# Patient Record
Sex: FEMALE | Race: WHITE | NOT HISPANIC OR LATINO | Employment: OTHER | URBAN - METROPOLITAN AREA
[De-identification: names, ages, dates, MRNs, and addresses within clinical notes are randomized per-mention and may not be internally consistent; named-entity substitution may affect disease eponyms.]

---

## 2020-08-17 NOTE — PROGRESS NOTES
Encounter Date:08/18/2020    Location: [facility]    Ref: Ayesha Hines MD    Patient ID: Lucero Campbell is a 79 y.o. female, resident of [place], [place].    1940  Subjective:      Chief Complaint/Reason for visit:  Irregular heart beat    Problem List:  1. Irregular Heart Rate noted at wellness exam 7/2020.   A. Patient asymptomatic  2. Hypertension  3. Hyperlipidemia  4. Obesity- BMI 37.0  5. Hypothyroidism  6. Macular Degeneration  7. Rheumatoid Arthritis  A. Methotrexate for 25 years, currently no medications.  8. Surgical History  A. Bilateral Cataract extraction  B. Hysterectomy    HPI: Mrs. Campbell is a 79 yr old white female who presents in consultation today at the request of Ayesha Hines MD for further evaluation of her irregular heart rate. She states that when she saw Dr. Hines for her wellness exam in July she was told that her heart rate was irregular.  She is unaware and denies any significant palpitations.  She states that she may have noticed a mild irregularity in her heart the day she was seen by Dr. Hines.  She takes her blood pressure at home and has not noticed any high heart rates. Her blood pressure runs in the 120-130 range.  She denies any symptoms of chest pain, unusual shortness of breath, orthopnea, PND or syncope.  She does have mild lower extremity that is no worse than usual.    Social History     Socioeconomic History   • Marital status:      Spouse name: Not on file   • Number of children: Not on file   • Years of education: Not on file   • Highest education level: Not on file   Tobacco Use   • Smoking status: Never Smoker   • Smokeless tobacco: Never Used   Substance and Sexual Activity   • Alcohol use: Yes     Comment: wine with dinner    • Drug use: Never   • Sexual activity: Defer       family history includes Cancer in her mother; Heart failure in her father.     has a past medical history of Arthritis, Blood disorder, Chicken pox, Colitis,  "Hyperlipidemia, Hypertension, Measles, Mumps, Phlebitis, and Thyroid disorder.    No Known Allergies      Current Outpatient Medications:   •  dilTIAZem (CARDIZEM) 120 MG tablet, Take 120 mg by mouth Daily., Disp: , Rfl:   •  FOLIC ACID PO, Take 1 tablet by mouth Daily., Disp: , Rfl:   •  hydroCHLOROthiazide (MICROZIDE) 12.5 MG capsule, Take 12.5 mg by mouth Daily., Disp: , Rfl:   •  levothyroxine (SYNTHROID, LEVOTHROID) 75 MCG tablet, Take 75 mcg by mouth Daily., Disp: , Rfl:   •  Naproxen Sodium (ALEVE PO), Take  by mouth As Needed., Disp: , Rfl:   •  pravastatin (PRAVACHOL) 40 MG tablet, Take 40 mg by mouth Daily., Disp: , Rfl:   •  valsartan 80 MG tablet 160 mg, hydroCHLOROthiazide 12.5 MG capsule 12.5 mg, Take  by mouth Daily., Disp: , Rfl:   •  VITAMIN D PO, Take 1 tablet by mouth Daily., Disp: , Rfl:     Review of Systems   Constitution: Negative.   HENT: Negative.    Eyes:        Glasses  Macular degeneration   Cardiovascular: Positive for leg swelling. Negative for chest pain, dyspnea on exertion, irregular heartbeat, near-syncope, orthopnea, palpitations, paroxysmal nocturnal dyspnea and syncope.   Respiratory: Negative.    Endocrine: Negative.    Hematologic/Lymphatic: Negative.    Skin: Negative.    Musculoskeletal: Positive for arthritis.   Gastrointestinal: Negative.    Genitourinary: Negative.    Neurological: Negative.    Psychiatric/Behavioral: Negative.    Allergic/Immunologic: Negative.        Vitals:    08/18/20 0858   BP: 132/72   BP Location: Left arm   Patient Position: Sitting   Pulse: 79   SpO2: 98%   Weight: 91.6 kg (202 lb)   Height: 157.5 cm (62\")     Objective:       Physical Exam   Constitutional: She is oriented to person, place, and time. She appears well-developed and well-nourished.   HENT:   Head: Normocephalic and atraumatic.   Eyes: Pupils are equal, round, and reactive to light.   Neck: Normal range of motion. No thyromegaly present.   Cardiovascular: Normal rate and regular " rhythm.   Murmur heard.  1/6 ROBERT   Pulmonary/Chest: Effort normal and breath sounds normal. No respiratory distress.   Abdominal: Soft. Bowel sounds are normal.   Musculoskeletal: She exhibits edema.   1 plus right greater than left   Neurological: She is alert and oriented to person, place, and time.   Skin: Skin is warm and dry.   Psychiatric: She has a normal mood and affect. Her behavior is normal. Judgment and thought content normal.   Vitals reviewed.    Data Review:     ECG 12 Lead  Date/Time: 8/18/2020 9:20 AM  Performed by: Marina Estrella MD  Authorized by: Marina Estrella MD   Comparison: not compared with previous ECG   Previous ECG: no previous ECG available  Rhythm: sinus rhythm  Rate: normal  BPM: 75  QRS axis: normal  Other findings: non-specific ST-T wave changes    Clinical impression: abnormal EKG          Lipid Panel 7/31/20  TC- 231  Trig- 69  HDL- 103  LDL-113     Assessment:      Diagnosis Plan   1. Irregular heart rate  Adult Transthoracic Echo Complete W/ Cont if Necessary Per Protocol    Holter Monitor - 24 Hour   2. Abnormal EKG     3. Essential hypertension     4. Mixed hyperlipidemia     5. Severe obesity (BMI 35.0-39.9) with comorbidity (CMS/HCC)     6. Abnormal electrocardiogram (ECG) (EKG)   Adult Transthoracic Echo Complete W/ Cont if Necessary Per Protocol     Plan:   1. Echocardiogram to further evaluate cardiac murmur/ abnormal EKG.  2. 24 Hour Holter for further evaluation of irregular heart rate.  3. Continue Valsartan and Cardizem for hypertension.  4. Continue Pravastatin for hyperlipidemia.  5. Consider future Lexiscan Stress for any change in symptoms.  6. Follow up in 6 weeks.    Scribed for Marina Estrella MD by Nerissa Summers RN. 8/18/2020  09:39     I Marina Estrella MD personally performed the services described in this documentation as scribed by the above individual in my presence, and it is both accurate and complete.    Marina  MD Delores, [facility]      Please note that portions of this note may have been completed with a voice recognition program. Efforts were made to edit the dictations, but occasionally words are mistranscribed.

## 2020-08-18 ENCOUNTER — CONSULT (OUTPATIENT)
Dept: CARDIOLOGY | Facility: CLINIC | Age: 80
End: 2020-08-18

## 2020-08-18 VITALS
BODY MASS INDEX: 37.17 KG/M2 | DIASTOLIC BLOOD PRESSURE: 72 MMHG | HEIGHT: 62 IN | HEART RATE: 79 BPM | SYSTOLIC BLOOD PRESSURE: 132 MMHG | OXYGEN SATURATION: 98 % | WEIGHT: 202 LBS

## 2020-08-18 DIAGNOSIS — E66.01 SEVERE OBESITY (BMI 35.0-39.9) WITH COMORBIDITY (HCC): ICD-10-CM

## 2020-08-18 DIAGNOSIS — I49.9 IRREGULAR HEART RATE: Primary | ICD-10-CM

## 2020-08-18 DIAGNOSIS — E78.2 MIXED HYPERLIPIDEMIA: ICD-10-CM

## 2020-08-18 DIAGNOSIS — I10 ESSENTIAL HYPERTENSION: ICD-10-CM

## 2020-08-18 DIAGNOSIS — R94.31 ABNORMAL EKG: ICD-10-CM

## 2020-08-18 DIAGNOSIS — R94.31 ABNORMAL ELECTROCARDIOGRAM (ECG) (EKG): ICD-10-CM

## 2020-08-18 PROCEDURE — 99204 OFFICE O/P NEW MOD 45 MIN: CPT | Performed by: INTERNAL MEDICINE

## 2020-08-18 PROCEDURE — 93000 ELECTROCARDIOGRAM COMPLETE: CPT | Performed by: INTERNAL MEDICINE

## 2020-08-18 RX ORDER — LEVOTHYROXINE SODIUM 0.07 MG/1
75 TABLET ORAL DAILY
COMMUNITY

## 2020-08-18 RX ORDER — PRAVASTATIN SODIUM 40 MG
40 TABLET ORAL DAILY
COMMUNITY

## 2020-08-18 RX ORDER — DILTIAZEM HYDROCHLORIDE 120 MG/1
120 TABLET, FILM COATED ORAL DAILY
COMMUNITY

## 2020-08-18 RX ORDER — HYDROCHLOROTHIAZIDE 12.5 MG/1
12.5 CAPSULE, GELATIN COATED ORAL DAILY
COMMUNITY

## 2020-09-20 ENCOUNTER — HOSPITAL ENCOUNTER (OUTPATIENT)
Dept: CARDIOLOGY | Facility: HOSPITAL | Age: 80
Discharge: HOME | End: 2020-09-20
Admitting: INTERNAL MEDICINE

## 2020-09-20 VITALS — HEIGHT: 62 IN | WEIGHT: 202 LBS | BODY MASS INDEX: 37.17 KG/M2

## 2020-09-20 DIAGNOSIS — R94.31 ABNORMAL ELECTROCARDIOGRAM (ECG) (EKG): ICD-10-CM

## 2020-09-20 DIAGNOSIS — I49.9 IRREGULAR HEART RATE: ICD-10-CM

## 2020-09-20 PROCEDURE — 93306 TTE W/DOPPLER COMPLETE: CPT

## 2020-09-20 PROCEDURE — 93306 TTE W/DOPPLER COMPLETE: CPT | Performed by: INTERNAL MEDICINE

## 2020-09-23 LAB
BH CV ECHO MEAS - AI DEC SLOPE: 172.5 CM/SEC^2
BH CV ECHO MEAS - AI MAX PG: 42.5 MMHG
BH CV ECHO MEAS - AI MAX VEL: 326 CM/SEC
BH CV ECHO MEAS - AI P1/2T: 553.5 MSEC
BH CV ECHO MEAS - AO MAX PG (FULL): 7 MMHG
BH CV ECHO MEAS - AO MAX PG: 13 MMHG
BH CV ECHO MEAS - AO MEAN PG (FULL): 4 MMHG
BH CV ECHO MEAS - AO MEAN PG: 7 MMHG
BH CV ECHO MEAS - AO ROOT AREA (BSA CORRECTED): 1.5
BH CV ECHO MEAS - AO ROOT AREA: 6.6 CM^2
BH CV ECHO MEAS - AO ROOT DIAM: 2.9 CM
BH CV ECHO MEAS - AO V2 MAX: 178 CM/SEC
BH CV ECHO MEAS - AO V2 MEAN: 129 CM/SEC
BH CV ECHO MEAS - AO V2 VTI: 35.2 CM
BH CV ECHO MEAS - AVA(I,A): 2.2 CM^2
BH CV ECHO MEAS - AVA(I,D): 2.2 CM^2
BH CV ECHO MEAS - AVA(V,A): 2.2 CM^2
BH CV ECHO MEAS - AVA(V,D): 2.2 CM^2
BH CV ECHO MEAS - BSA(HAYCOCK): 2 M^2
BH CV ECHO MEAS - BSA: 1.9 M^2
BH CV ECHO MEAS - BZI_BMI: 36.9 KILOGRAMS/M^2
BH CV ECHO MEAS - BZI_METRIC_HEIGHT: 157.5 CM
BH CV ECHO MEAS - BZI_METRIC_WEIGHT: 91.6 KG
BH CV ECHO MEAS - EDV(CUBED): 42.9 ML
BH CV ECHO MEAS - EDV(MOD-SP2): 96.8 ML
BH CV ECHO MEAS - EDV(MOD-SP4): 120 ML
BH CV ECHO MEAS - EDV(TEICH): 50.9 ML
BH CV ECHO MEAS - EF(CUBED): 48.8 %
BH CV ECHO MEAS - EF(MOD-BP): 50.4 %
BH CV ECHO MEAS - EF(MOD-SP2): 50.2 %
BH CV ECHO MEAS - EF(MOD-SP4): 52.5 %
BH CV ECHO MEAS - EF(TEICH): 41.9 %
BH CV ECHO MEAS - ESV(CUBED): 22 ML
BH CV ECHO MEAS - ESV(MOD-SP2): 48.2 ML
BH CV ECHO MEAS - ESV(MOD-SP4): 57 ML
BH CV ECHO MEAS - ESV(TEICH): 29.6 ML
BH CV ECHO MEAS - FS: 20 %
BH CV ECHO MEAS - IVS/LVPW: 0.91
BH CV ECHO MEAS - IVSD: 1 CM
BH CV ECHO MEAS - LA DIMENSION: 5 CM
BH CV ECHO MEAS - LA/AO: 1.8
BH CV ECHO MEAS - LAD MAJOR: 5.7 CM
BH CV ECHO MEAS - LAT PEAK E' VEL: 7.2 CM/SEC
BH CV ECHO MEAS - LATERAL E/E' RATIO: 10.5
BH CV ECHO MEAS - LV DIASTOLIC VOL/BSA (35-75): 62.5 ML/M^2
BH CV ECHO MEAS - LV MASS(C)D: 111 GRAMS
BH CV ECHO MEAS - LV MASS(C)DI: 57.8 GRAMS/M^2
BH CV ECHO MEAS - LV MAX PG: 6 MMHG
BH CV ECHO MEAS - LV MEAN PG: 3 MMHG
BH CV ECHO MEAS - LV SYSTOLIC VOL/BSA (12-30): 29.7 ML/M^2
BH CV ECHO MEAS - LV V1 MAX: 122 CM/SEC
BH CV ECHO MEAS - LV V1 MEAN: 86.7 CM/SEC
BH CV ECHO MEAS - LV V1 VTI: 25 CM
BH CV ECHO MEAS - LVIDD: 3.5 CM
BH CV ECHO MEAS - LVIDS: 2.8 CM
BH CV ECHO MEAS - LVLD AP2: 6.8 CM
BH CV ECHO MEAS - LVLD AP4: 6.9 CM
BH CV ECHO MEAS - LVLS AP2: 5.8 CM
BH CV ECHO MEAS - LVLS AP4: 6.2 CM
BH CV ECHO MEAS - LVOT AREA (M): 3.1 CM^2
BH CV ECHO MEAS - LVOT AREA: 3.1 CM^2
BH CV ECHO MEAS - LVOT DIAM: 2 CM
BH CV ECHO MEAS - LVPWD: 1.1 CM
BH CV ECHO MEAS - MED PEAK E' VEL: 7.3 CM/SEC
BH CV ECHO MEAS - MEDIAL E/E' RATIO: 10.3
BH CV ECHO MEAS - MV A MAX VEL: 93.1 CM/SEC
BH CV ECHO MEAS - MV DEC SLOPE: 316 CM/SEC^2
BH CV ECHO MEAS - MV DEC TIME: 0.24 SEC
BH CV ECHO MEAS - MV E MAX VEL: 75.2 CM/SEC
BH CV ECHO MEAS - MV E/A: 0.81
BH CV ECHO MEAS - MV MAX PG: 4.4 MMHG
BH CV ECHO MEAS - MV MEAN PG: 2 MMHG
BH CV ECHO MEAS - MV V2 MAX: 105 CM/SEC
BH CV ECHO MEAS - MV V2 MEAN: 62.9 CM/SEC
BH CV ECHO MEAS - MV V2 VTI: 26.8 CM
BH CV ECHO MEAS - MVA(VTI): 2.9 CM^2
BH CV ECHO MEAS - PA ACC TIME: 0.08 SEC
BH CV ECHO MEAS - PA MAX PG: 5.3 MMHG
BH CV ECHO MEAS - PA PR(ACCEL): 44.4 MMHG
BH CV ECHO MEAS - PA V2 MAX: 115 CM/SEC
BH CV ECHO MEAS - RAP SYSTOLE: 3 MMHG
BH CV ECHO MEAS - RVSP: 29 MMHG
BH CV ECHO MEAS - SI(AO): 121.1 ML/M^2
BH CV ECHO MEAS - SI(CUBED): 10.9 ML/M^2
BH CV ECHO MEAS - SI(LVOT): 40.9 ML/M^2
BH CV ECHO MEAS - SI(MOD-SP2): 25.3 ML/M^2
BH CV ECHO MEAS - SI(MOD-SP4): 32.8 ML/M^2
BH CV ECHO MEAS - SI(TEICH): 11.1 ML/M^2
BH CV ECHO MEAS - SV(AO): 232.5 ML
BH CV ECHO MEAS - SV(CUBED): 20.9 ML
BH CV ECHO MEAS - SV(LVOT): 78.5 ML
BH CV ECHO MEAS - SV(MOD-SP2): 48.6 ML
BH CV ECHO MEAS - SV(MOD-SP4): 63 ML
BH CV ECHO MEAS - SV(TEICH): 21.3 ML
BH CV ECHO MEAS - TAPSE (>1.6): 2 CM
BH CV ECHO MEAS - TR MAX PG: 26 MMHG
BH CV ECHO MEAS - TR MAX VEL: 243 CM/SEC
BH CV ECHO MEASUREMENTS AVERAGE E/E' RATIO: 10.37
BH CV VAS BP LEFT ARM: NORMAL MMHG
BH CV XLRA - RV BASE: 2.9 CM
BH CV XLRA - RV LENGTH: 6.5 CM
BH CV XLRA - RV MID: 2 CM
BH CV XLRA - TDI S': 17.6 CM/SEC
LEFT ATRIUM VOLUME INDEX: 39.1 ML/M^2
LEFT ATRIUM VOLUME: 75.1 ML
LV EF 2D ECHO EST: 60 %
MAXIMAL PREDICTED HEART RATE: 143 BPM
STRESS TARGET HR: 122 BPM

## 2020-10-07 ENCOUNTER — OFFICE VISIT (OUTPATIENT)
Dept: CARDIOLOGY | Facility: CLINIC | Age: 80
End: 2020-10-07

## 2020-10-07 VITALS
HEIGHT: 62 IN | OXYGEN SATURATION: 98 % | WEIGHT: 205 LBS | SYSTOLIC BLOOD PRESSURE: 144 MMHG | DIASTOLIC BLOOD PRESSURE: 62 MMHG | TEMPERATURE: 97.3 F | BODY MASS INDEX: 37.73 KG/M2 | HEART RATE: 87 BPM

## 2020-10-07 DIAGNOSIS — E66.01 SEVERE OBESITY (BMI 35.0-39.9) WITH COMORBIDITY (HCC): ICD-10-CM

## 2020-10-07 DIAGNOSIS — E78.2 MIXED HYPERLIPIDEMIA: ICD-10-CM

## 2020-10-07 DIAGNOSIS — I49.9 IRREGULAR HEART RATE: Primary | ICD-10-CM

## 2020-10-07 DIAGNOSIS — I10 ESSENTIAL HYPERTENSION: ICD-10-CM

## 2020-10-07 PROCEDURE — 99213 OFFICE O/P EST LOW 20 MIN: CPT | Performed by: INTERNAL MEDICINE

## 2020-10-07 NOTE — PROGRESS NOTES
[facility]    Patient ID: Lucero Campbell is a 79 y.o. female.  : 1940   Contact: [phone]    Encounter date: 10/07/2020    PCP: Ayesha Hines MD      Chief complaint:   Chief Complaint   Patient presents with   • Irregular Heart Beat       Problem List:  1. Irregular Heart Rate noted at wellness exam 2020. Patient asymptomatic  a. 3 day Holter monitor, 2020: Sinus rhythm/sinus bradycardia. Rare PACs, no PVCs. 3 beats SVT, fastest 140 bpm. No atrial fibrillation.  b. Echocardiogram, 2020: EF 60%. Trace MR and trace to mild TR with RVSP 29 mmHg. Grade I LV diastolic function.  2. Hypertension  3. Hyperlipidemia  4. Obesity- BMI 37.0  5. Hypothyroidism  6. Macular Degeneration  7. Rheumatoid Arthritis  a. Methotrexate for 25 years, currently no medications.  8. Surgical History  a. Bilateral Cataract extraction  b. Hysterectomy    No Known Allergies    Current Medications:    Current Outpatient Medications:   •  dilTIAZem (CARDIZEM) 120 MG tablet, Take 120 mg by mouth Daily., Disp: , Rfl:   •  FOLIC ACID PO, Take 1 tablet by mouth Daily., Disp: , Rfl:   •  hydroCHLOROthiazide (MICROZIDE) 12.5 MG capsule, Take 12.5 mg by mouth Daily., Disp: , Rfl:   •  levothyroxine (SYNTHROID, LEVOTHROID) 75 MCG tablet, Take 75 mcg by mouth Daily., Disp: , Rfl:   •  Naproxen Sodium (ALEVE PO), Take  by mouth As Needed., Disp: , Rfl:   •  pravastatin (PRAVACHOL) 40 MG tablet, Take 40 mg by mouth Daily., Disp: , Rfl:   •  valsartan 80 MG tablet 160 mg, hydroCHLOROthiazide 12.5 MG capsule 12.5 mg, Take  by mouth Daily., Disp: , Rfl:   •  VITAMIN D PO, Take 1 tablet by mouth Daily., Disp: , Rfl:     HPI    Lucero Campbell is a 79 y.o. female who presents today for 6 week follow up s/p echocardiogram for irregular heart rate and abnormal EKG with cardiac risk factors. Since last visit, patient has been feeling well overall from a cardiovascular standpoint. She  "reports that her blood pressure has been doing well overall, and plans to monitor her blood pressure at home. She claims that has \"a fair amount of salt\" in her diet. Patient denies chest pain, shortness of breath, palpitations, edema, dizziness, and syncope.       The following portions of the patient's history were reviewed and updated as appropriate: allergies, current medications and problem list.    Pertinent positives as listed in the HPI.  All other systems reviewed are negative.         Vitals:    10/07/20 1010   BP: 144/62   BP Location: Left arm   Patient Position: Sitting   Cuff Size: Adult   Pulse: 87   Temp: 97.3 °F (36.3 °C)   SpO2: 98%   Weight: 93 kg (205 lb)   Height: 157.5 cm (62\")       Physical Exam:  General: Alert and oriented.  Neck: Jugular venous pressure is within normal limits. Carotids have normal upstrokes without bruits.   Cardiovascular: Heart has a nondisplaced focal PMI. Regular rate and rhythm. No murmur, gallop or rub.  Lungs: Clear, no rales or wheezes. Equal expansion is noted.   Extremities: Show no edema.  Skin: Warm and dry.  Neurologic: Nonfocal.     Diagnostic Data (reviewed with patient):    Lipid Panel 7/31/20  TC- 231  Trig- 69  HDL- 103  LDL- 113    Procedures      Assessment:    ICD-10-CM ICD-9-CM   1. Irregular heart rate  I49.9 427.9   2. Essential hypertension  I10 401.9   3. Mixed hyperlipidemia  E78.2 272.2   4. Severe obesity (BMI 35.0-39.9) with comorbidity (CMS/[facility])  E66.01 278.01         Plan:  1. Encouraged to monitor sodium intake to reduce LE edema.  Consider support stockings.  2. Continue Valsartan and Cardizem for hypertension.  3. Continue pravastatin for hyperlipidemia.   4. Continue all other current medications.  5. F/up as needed.    Scribed for Marina Estrella MD by Bereket Brandt 10/7/2020  10:17 EDT    I Marina Estrella MD personally performed the services described in this documentation as scribed by the above individual in my presence, " and it is both accurate and complete.    Marina Estrella MD, FACC